# Patient Record
Sex: MALE | Race: WHITE | NOT HISPANIC OR LATINO | URBAN - METROPOLITAN AREA
[De-identification: names, ages, dates, MRNs, and addresses within clinical notes are randomized per-mention and may not be internally consistent; named-entity substitution may affect disease eponyms.]

---
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
## 2024-08-02 NOTE — CARE PLAN
Problem: Psychosocial  Goal: Patient will experience minimized separation anxiety and fear  Outcome: Progressing     Problem: Self Care  Goal: Patient will have the ability to perform ADLs independently or with assistance (bathe, groom, dress, toilet and feed)  Outcome: Progressing   The patient is Stable - Low risk of patient condition declining or worsening    Shift Goals  Clinical Goals: Maintain patient safety  Patient Goals: Rest  Family Goals: Updated on POC    Progress made toward(s) clinical / shift goals:  Patient has been journaling and progressing with his care plan. Patient states that he believes it is helping.    Patient is not progressing towards the following goals:       Hpi Title: Evaluation of Skin Lesions How Severe Are Your Spot(S)?: mild Have Your Spot(S) Been Treated In The Past?: has not been treated Year Removed: 1900 What Type Of Note Output Would You Prefer (Optional)?: Standard Output